# Patient Record
Sex: FEMALE | Race: WHITE | NOT HISPANIC OR LATINO | Employment: FULL TIME | URBAN - METROPOLITAN AREA
[De-identification: names, ages, dates, MRNs, and addresses within clinical notes are randomized per-mention and may not be internally consistent; named-entity substitution may affect disease eponyms.]

---

## 2023-11-16 ENCOUNTER — TELEPHONE (OUTPATIENT)
Dept: OBSTETRICS AND GYNECOLOGY | Facility: CLINIC | Age: 33
End: 2023-11-16

## 2023-11-16 RX ORDER — DROSPIRENONE AND ETHINYL ESTRADIOL 0.02-3(28)
1 KIT ORAL DAILY
Qty: 28 TABLET | Refills: 1 | Status: SHIPPED | OUTPATIENT
Start: 2023-11-16 | End: 2023-11-17

## 2023-11-16 NOTE — TELEPHONE ENCOUNTER
"Rhianna,     Rx sent       Current Outpatient Medications:     drospirenone-ethinyl estradiol (JOE,GIANVI) 3-0.02 MG per tablet, Take 1 tablet by mouth Daily for 58 days., Disp: 28 tablet, Rfl: 1    cyanocobalamin (VITAMIN B-12) 250 MCG tablet, Take 250 mcg by mouth Daily., Disp: , Rfl:     hydrOXYzine (ATARAX) 10 MG tablet, , Disp: , Rfl:     venlafaxine XR (EFFEXOR-XR) 75 MG 24 hr capsule, , Disp: , Rfl:     Thanks,   Dr. Zuleta      Caller: Susana WEEKS \"Eyal\"    Relationship: Self    Best call back number: [phone]    Requested Prescriptions:   Requested Prescriptions     Pending Prescriptions Disp Refills    drospirenone-ethinyl estradiol (JOE,GIANVI) 3-0.02 MG per tablet 58 tablet 0     Sig: Take 1 tablet by mouth Daily for 58 days.        Pharmacy where request should be sent:  [facility]    Last office visit with prescribing clinician: 9/13/2022     Next office visit with prescribing clinician: 2/9/2024     Additional details provided by patient: PATIENT LOST HER INSURANCE FOR A COUPLE OF MONTHS AND IT WILL BE REINSTATED IN JANUARY BUT DR. ZULETA IS BOOKED OUT UNTIL FEBRUARY. PLEASE REFILL UNTIL HER APPT     Does the patient have less than a 3 day supply:  [x] Yes  [] No    Would you like a call back once the refill request has been completed: [x] Yes [] No    If the office needs to give you a call back, can they leave a voicemail: [x] Yes [] No    Agatha Mcfarlane Rep   11/16/23 09:38 EST         "

## 2023-11-17 RX ORDER — DROSPIRENONE AND ETHINYL ESTRADIOL TABLETS 0.02-3(28)
1 KIT ORAL DAILY
Qty: 28 TABLET | Refills: 2 | Status: SHIPPED | OUTPATIENT
Start: 2023-11-17

## 2024-01-17 RX ORDER — DROSPIRENONE AND ETHINYL ESTRADIOL 0.02-3(28)
1 KIT ORAL DAILY
Qty: 84 TABLET | OUTPATIENT
Start: 2024-01-17

## 2024-01-17 RX ORDER — DROSPIRENONE AND ETHINYL ESTRADIOL 0.02-3(28)
1 KIT ORAL DAILY
Qty: 28 TABLET | Refills: 0 | Status: SHIPPED | OUTPATIENT
Start: 2024-01-17

## 2024-02-22 ENCOUNTER — OFFICE VISIT (OUTPATIENT)
Dept: OBSTETRICS AND GYNECOLOGY | Facility: CLINIC | Age: 34
End: 2024-02-22
Payer: COMMERCIAL

## 2024-02-22 VITALS
DIASTOLIC BLOOD PRESSURE: 73 MMHG | WEIGHT: 245 LBS | SYSTOLIC BLOOD PRESSURE: 127 MMHG | HEART RATE: 80 BPM | HEIGHT: 66 IN | BODY MASS INDEX: 39.37 KG/M2

## 2024-02-22 DIAGNOSIS — N63.11 MASS OF UPPER OUTER QUADRANT OF RIGHT BREAST: ICD-10-CM

## 2024-02-22 DIAGNOSIS — Z30.41 ENCOUNTER FOR SURVEILLANCE OF CONTRACEPTIVE PILLS: ICD-10-CM

## 2024-02-22 DIAGNOSIS — Z01.419 ENCOUNTER FOR GYNECOLOGICAL EXAMINATION WITHOUT ABNORMAL FINDING: Primary | ICD-10-CM

## 2024-02-22 RX ORDER — HYDROXYZINE 50 MG/1
50 TABLET, FILM COATED ORAL
COMMUNITY
Start: 2023-09-22

## 2024-02-22 RX ORDER — VENLAFAXINE HYDROCHLORIDE 150 MG/1
150 CAPSULE, EXTENDED RELEASE ORAL DAILY
COMMUNITY
Start: 2024-01-10 | End: 2024-04-09

## 2024-02-22 RX ORDER — DROSPIRENONE AND ETHINYL ESTRADIOL 0.02-3(28)
1 KIT ORAL DAILY
Qty: 84 TABLET | Refills: 3 | Status: SHIPPED | OUTPATIENT
Start: 2024-02-22

## 2024-02-22 RX ORDER — TIRZEPATIDE 2.5 MG/.5ML
2.5 INJECTION, SOLUTION SUBCUTANEOUS
COMMUNITY
Start: 2024-01-30

## 2024-02-22 RX ORDER — ERGOCALCIFEROL 1.25 MG/1
1 CAPSULE ORAL WEEKLY
COMMUNITY
Start: 2024-01-12

## 2024-02-22 NOTE — PROGRESS NOTES
GYN Annual Exam     CC- Here for annual exam.     Susana WEEKS is a 33 y.o. female who presents for annual well woman exam. Periods are  absent due to OCP's.   Pt c/o R breast lump, recently found a few weeks ago in the shower. No longer feels.     OB History          4    Para   2    Term   2            AB   2    Living   2         SAB   2    IAB        Ectopic        Molar        Multiple   0    Live Births   2                Current contraception: OCP (estrogen/progesterone)  History of abnormal Pap smear: yes - no treatment required  Family history of uterine, colon or ovarian cancer: no  History of abnormal mammogram: yes - Benign breast biopsy before on Right   Family history of breast cancer: yes - Grandmother   Last Pap : 2022 NIL HPV neg     Past Medical History:   Diagnosis Date    Abnormal Pap smear of cervix have had a couple in the pass I think.    Anxiety     Depression     Gestational hypertension     Kidney stone     Migraine once every other week    Multiple gestation 2020    PONV (postoperative nausea and vomiting)     Preeclampsia     Trauma in therapy currently for this    Urinary tract infection     Varicella        Past Surgical History:   Procedure Laterality Date    BREAST BIOPSY       SECTION       SECTION N/A 3/16/2020    Procedure:  SECTION REPEAT;  Surgeon: Mark Schroeder MD;  Location: [facility] LABOR DELIVERY;  Service: Obstetrics/Gynecology;  Laterality: N/A;    MOUTH SURGERY      WISDOM TOOTH EXTRACTION           Current Outpatient Medications:     drospirenone-ethinyl estradiol (JOE,GIANVI) 3-0.02 MG per tablet, Take 1 tablet by mouth Daily., Disp: 84 tablet, Rfl: 3    ergocalciferol (ERGOCALCIFEROL) 1.25 MG (69879 UT) capsule, Take 1 capsule by mouth 1 (One) Time Per Week., Disp: , Rfl:     hydrOXYzine (ATARAX) 50 MG tablet, Take 1 tablet by mouth., Disp: , Rfl:     Tirzepatide-Weight Management (Zepbound) 2.5 MG/0.5ML  "solution auto-injector, Inject 0.5 mL under the skin into the appropriate area as directed., Disp: , Rfl:     venlafaxine XR (EFFEXOR-XR) 150 MG 24 hr capsule, Take 1 capsule by mouth Daily., Disp: , Rfl:     cyanocobalamin (VITAMIN B-12) 250 MCG tablet, Take 250 mcg by mouth Daily., Disp: , Rfl:     No Known Allergies    Social History     Tobacco Use    Smoking status: Every Day     Types: Electronic Cigarette    Smokeless tobacco: Never    Tobacco comments:     Stopped 6/2019   Vaping Use    Vaping Use: Every day   Substance Use Topics    Alcohol use: Not Currently    Drug use: Yes     Types: Cocaine(coke), Fentanyl, Marijuana, Methamphetamines       Family History   Problem Relation Age of Onset    Hypertension Father     Kidney cancer Father     Breast cancer Maternal Grandmother         Breast, 14years later she then got liver, bone, and brain cancer    Ovarian cancer Neg Hx     Uterine cancer Neg Hx     Colon cancer Neg Hx     Deep vein thrombosis Neg Hx     Pulmonary embolism Neg Hx        Review of Systems   Constitutional:  Negative for chills and fever.   Gastrointestinal:  Negative for abdominal pain, constipation and diarrhea.   Genitourinary:  Positive for breast lump. Negative for breast pain, menstrual problem, pelvic pain, vaginal bleeding and vaginal discharge.   All other systems reviewed and are negative.      /73   Pulse 80   Ht 167.6 cm (66\")   Wt 111 kg (245 lb)   BMI 39.54 kg/m²     Physical Exam  Constitutional:       General: She is not in acute distress.     Appearance: She is well-developed. She is obese.   Genitourinary:      Vulva normal.      Right Labia: No lesions or Bartholin's cyst.     Left Labia: No lesions or Bartholin's cyst.     No inguinal adenopathy present in the right or left side.     No vaginal discharge or bleeding.        Right Adnexa: not tender, not full and no mass present.     Left Adnexa: not tender, not full and no mass present.     No cervical motion " tenderness or friability.      Uterus is not enlarged or tender.      No uterine mass detected.     Uterus is anteverted.   Breasts:     Right: Mass (12:00 area - 1 cm rubbery) present. No swelling, bleeding, inverted nipple, nipple discharge, skin change or tenderness.      Left: No inverted nipple, mass or nipple discharge.   HENT:      Head: Normocephalic and atraumatic.      Nose: Nose normal.   Eyes:      Conjunctiva/sclera: Conjunctivae normal.      Pupils: Pupils are equal, round, and reactive to light.   Neck:      Thyroid: No thyromegaly.   Cardiovascular:      Rate and Rhythm: Normal rate and regular rhythm.      Heart sounds: Normal heart sounds. No murmur heard.  Pulmonary:      Effort: Pulmonary effort is normal. No respiratory distress.      Breath sounds: Normal breath sounds.   Abdominal:      General: Abdomen is flat. There is no distension.      Palpations: Abdomen is soft.      Tenderness: There is no abdominal tenderness.   Musculoskeletal:         General: No deformity. Normal range of motion.      Cervical back: Normal range of motion and neck supple.      Right lower leg: No edema.      Left lower leg: No edema.   Lymphadenopathy:      Upper Body:      Right upper body: No supraclavicular or axillary adenopathy.      Lower Body: No right inguinal adenopathy. No left inguinal adenopathy.   Neurological:      Mental Status: She is alert and oriented to person, place, and time.   Skin:     General: Skin is warm and dry.      Findings: No erythema.   Psychiatric:         Behavior: Behavior normal.         Thought Content: Thought content normal.         Judgment: Judgment normal.   Vitals reviewed. Exam conducted with a chaperone present.               Assessment     Diagnoses and all orders for this visit:    1. Encounter for gynecological examination without abnormal finding (Primary)    2. Mass of upper outer quadrant of right breast  -     Mammo Diagnostic Digital Tomosynthesis Bilateral With  CAD; Future  -     US Breast Right Limited; Future    3. Encounter for surveillance of contraceptive pills    Other orders  -     drospirenone-ethinyl estradiol (JOE,GIANVI) 3-0.02 MG per tablet; Take 1 tablet by mouth Daily.  Dispense: 84 tablet; Refill: 3    1) GYN exam   Continue OCP   BP and pap good     2) New breast mass  Hx of excision of cystic lesion   New finding now, recheck breast imaging and go from there   Expectations reviewed.      Plan     1) Breast Health - Clinical breast exam yearly, Discussed [employer] cancer society recommendations for breast cancer screening, and Self breast awareness monthly  See above   2) Pap - up to date   3) Smoking status- cessation encouraged   4) Encouraged between 7-8 hours of good sleep per night.   5) Follow up prn and one year.       Mark Schroeder MD   2/22/2024  15:56 EST

## 2024-02-22 NOTE — Clinical Note
Bailey, had Excision on right breast. Now with recurrent area of concern. Can we arrange Right breast ultrasound limited and MMG. Thanks, Dr. Schroeder

## 2024-02-23 ENCOUNTER — TELEPHONE (OUTPATIENT)
Dept: OBSTETRICS AND GYNECOLOGY | Facility: CLINIC | Age: 34
End: 2024-02-23
Payer: COMMERCIAL

## 2024-02-26 NOTE — TELEPHONE ENCOUNTER
Pt worked with [facility] and scheduled her appt for 3/15/24 @ 7 & 730am.  DX Bilat Mammo & Right Limited US.

## 2024-03-15 ENCOUNTER — APPOINTMENT (OUTPATIENT)
Dept: WOMENS IMAGING | Facility: HOSPITAL | Age: 34
End: 2024-03-15
Payer: COMMERCIAL

## 2024-03-15 PROCEDURE — 77062 BREAST TOMOSYNTHESIS BI: CPT | Performed by: RADIOLOGY

## 2024-03-15 PROCEDURE — G0279 TOMOSYNTHESIS, MAMMO: HCPCS | Performed by: RADIOLOGY

## 2024-03-15 PROCEDURE — 77066 DX MAMMO INCL CAD BI: CPT | Performed by: RADIOLOGY

## 2024-03-15 PROCEDURE — 76642 ULTRASOUND BREAST LIMITED: CPT | Performed by: RADIOLOGY

## 2024-03-18 ENCOUNTER — TELEPHONE (OUTPATIENT)
Dept: OBSTETRICS AND GYNECOLOGY | Facility: CLINIC | Age: 34
End: 2024-03-18
Payer: COMMERCIAL

## 2024-03-18 DIAGNOSIS — N63.11 MASS OF UPPER OUTER QUADRANT OF RIGHT BREAST: ICD-10-CM

## 2024-03-18 NOTE — PROGRESS NOTES
Cass,  Has mass in upper outer quadrant of right breast. Dx MMG and ultrasound with no abnormal findings - which is good. She can either continue to monitor her exam and let us know if any changes happen (larger or more definitive) or we can send to the surgeon to consider if they want to excise to know what is going on if she is still able to appreciate a mass on exam.  Please let her know and see if she wants referral to Dr. Corado or one of the general surgeons.  Thanks, Dr. Schroeder

## 2024-03-18 NOTE — TELEPHONE ENCOUNTER
----- Message from Mark Schroeder MD sent at 3/18/2024  9:54 AM EDT -----  Per notes will just call with concerns. If notes any changes can refer to general surgery.     Mark Schroeder MD  3/18/2024  09:54 EDT

## 2025-02-04 RX ORDER — DROSPIRENONE AND ETHINYL ESTRADIOL 0.02-3(28)
1 KIT ORAL DAILY
Qty: 84 TABLET | Refills: 30 | Status: SHIPPED | OUTPATIENT
Start: 2025-02-04

## 2025-02-06 NOTE — TELEPHONE ENCOUNTER
Spoke with Eyal and has new passport insurance. Will bring at appt. Scheduled first available 3/4/25 at [place] with Dr Schroeder.

## 2025-02-11 ENCOUNTER — PATIENT MESSAGE (OUTPATIENT)
Dept: OBSTETRICS AND GYNECOLOGY | Facility: CLINIC | Age: 35
End: 2025-02-11
Payer: COMMERCIAL

## 2025-02-11 RX ORDER — DROSPIRENONE AND ETHINYL ESTRADIOL 0.02-3(28)
1 KIT ORAL DAILY
Qty: 84 TABLET | Refills: 3 | Status: SHIPPED | OUTPATIENT
Start: 2025-02-11

## 2025-02-11 NOTE — TELEPHONE ENCOUNTER
Pt states new insurance and Rx for OCP needs to go to [facility], not [facility]. Updated refills from 30 to 3 and deleted the [facility] pharmacies and added the [facility]. Thank you